# Patient Record
Sex: MALE | Race: WHITE | NOT HISPANIC OR LATINO | Employment: FULL TIME | URBAN - NONMETROPOLITAN AREA
[De-identification: names, ages, dates, MRNs, and addresses within clinical notes are randomized per-mention and may not be internally consistent; named-entity substitution may affect disease eponyms.]

---

## 2023-05-18 PROBLEM — N50.89 SWELLING OF TESTICLE PRESENT ON EXAMINATION: Status: ACTIVE | Noted: 2023-05-18

## 2023-05-18 PROBLEM — C62.91: Status: ACTIVE | Noted: 2023-05-18

## 2023-05-18 RX ORDER — AMOXICILLIN AND CLAVULANATE POTASSIUM 875; 125 MG/1; MG/1
TABLET, FILM COATED ORAL
Qty: 14 TABLET | Refills: 0 | COMMUNITY
Start: 2023-05-13 | End: 2023-05-20

## 2023-05-20 ENCOUNTER — OFFICE VISIT (OUTPATIENT)
Dept: PRIMARY CARE | Facility: CLINIC | Age: 38
End: 2023-05-20
Payer: COMMERCIAL

## 2023-05-20 VITALS
DIASTOLIC BLOOD PRESSURE: 78 MMHG | SYSTOLIC BLOOD PRESSURE: 118 MMHG | WEIGHT: 162.9 LBS | OXYGEN SATURATION: 98 % | RESPIRATION RATE: 12 BRPM | TEMPERATURE: 98.6 F | HEART RATE: 72 BPM | BODY MASS INDEX: 22.4 KG/M2

## 2023-05-20 DIAGNOSIS — S61.451D DOG BITE OF RIGHT HAND, SUBSEQUENT ENCOUNTER: Primary | ICD-10-CM

## 2023-05-20 DIAGNOSIS — Z48.02 VISIT FOR SUTURE REMOVAL: ICD-10-CM
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
## 2023-05-20 PROCEDURE — 1036F TOBACCO NON-USER: CPT | Performed by: FAMILY MEDICINE

## 2023-05-20 PROCEDURE — 99213 OFFICE O/P EST LOW 20 MIN: CPT | Performed by: FAMILY MEDICINE

## 2023-05-20 PROCEDURE — S0630 REMOVAL OF SUTURES: HCPCS | Performed by: FAMILY MEDICINE

## 2023-05-20 NOTE — PROGRESS NOTES
Subjective   Patient ID: Gilmer Barnard is a 38 y.o. male who presents for an ED follow up and suture removal.     HPI   Location: [facility] ED  Date: 5/18/2023  Reason: dog bite    Prescribed 7 days of Augmentin abx. He stopped it bc he thought his hand didn't look infected. Has been taking at least 1 pill per day, denies adverse effects.   Last Tdap 10/2020  XR right hand-no acute findings  4 sutures placed in the palm of his hand.  Denies numbness/tingling, weakness, discharge from wound, redness or pain  Has no further concerns for today, reports normal ROM of hand and fingers    Patient ID: Gilmer Barnard is a 38 y.o. male.    Suture Removal    Date/Time: 5/20/2023 10:43 AM    Performed by: Yudy Jeffery DO  Authorized by: Yudy Jeffery DO    Consent:     Consent obtained:  Written    Consent given by:  Patient    Risks, benefits, and alternatives were discussed: yes      Risks discussed:  Bleeding, pain and wound separation    Alternatives discussed:  Delayed treatment  Universal protocol:     Relevant documents present and verified: yes      Test results available: yes      Imaging studies available: yes      Required blood products, implants, devices, and special equipment available: no      Site/side marked: yes      Immediately prior to procedure, a time out was called: yes      Patient identity confirmed:  Verbally with patient  Location:     Location:  Upper extremity    Upper extremity location:  Hand    Hand location:  R hand  Procedure details:     Wound appearance:  No signs of infection, good wound healing and clean    Number of sutures removed:  4  Post-procedure details:     Post-removal:  Antibiotic ointment applied, dressing applied and Steri-Strips applied    Procedure completion:  Tolerated well, no immediate complications      Review of Systems  See HPI    Objective   /78 (BP Location: Left arm, Patient Position: Sitting, BP Cuff Size: Adult)   Pulse 72   Temp 37 °C (98.6 °F)  (Temporal)   Resp 12   Wt 73.9 kg (162 lb 14.4 oz)   SpO2 98%   BMI 22.40 kg/m²     Physical Exam  Constitutional: Well developed, well nourished, alert and in no acute distress   Eyes: Normal external exam.   Cardiovascular: Regular rate and rhythm, normal S1 and S2, no murmurs, gallops, or rubs. Radial pulses normal. No peripheral edema.  Pulmonary: No respiratory distress, lungs clear to auscultation bilaterally. No wheezes, rhonchi, rales.  Skin: Warm, well perfused, normal skin turgor, well healing laceration of right palm, 4 sutures present without signs of infection.   Neurologic: Cranial nerves II-XII grossly intact.   Psychiatric: Mood calm and affect normal.    Assessment/Plan   Please continue and complete your antibiotic as directed.     Keep the steri strips in place, they will fall off on their own    Keep wound clean and dressed until completely healed    Please contact us with any concerns for infection: pain, redness, fever, streaking, purulent discharge.

## 2024-08-15 ENCOUNTER — APPOINTMENT (OUTPATIENT)
Dept: PRIMARY CARE | Facility: CLINIC | Age: 39
End: 2024-08-15
Payer: COMMERCIAL

## 2024-08-15 VITALS
WEIGHT: 169 LBS | SYSTOLIC BLOOD PRESSURE: 94 MMHG | BODY MASS INDEX: 23.24 KG/M2 | OXYGEN SATURATION: 96 % | RESPIRATION RATE: 14 BRPM | HEART RATE: 77 BPM | DIASTOLIC BLOOD PRESSURE: 64 MMHG | TEMPERATURE: 98.6 F

## 2024-08-15 DIAGNOSIS — G57.02 PIRIFORMIS SYNDROME OF LEFT SIDE: Primary | ICD-10-CM

## 2024-08-15 PROCEDURE — 99213 OFFICE O/P EST LOW 20 MIN: CPT | Performed by: FAMILY MEDICINE

## 2024-08-15 ASSESSMENT — PATIENT HEALTH QUESTIONNAIRE - PHQ9
SUM OF ALL RESPONSES TO PHQ9 QUESTIONS 1 AND 2: 0
1. LITTLE INTEREST OR PLEASURE IN DOING THINGS: NOT AT ALL
2. FEELING DOWN, DEPRESSED OR HOPELESS: NOT AT ALL

## 2024-08-15 ASSESSMENT — PAIN SCALES - GENERAL: PAINLEVEL: 0-NO PAIN

## 2024-08-15 ASSESSMENT — ENCOUNTER SYMPTOMS
WEAKNESS: 0
BACK PAIN: 1